# Patient Record
Sex: FEMALE | Race: WHITE | NOT HISPANIC OR LATINO | Employment: FULL TIME | URBAN - METROPOLITAN AREA
[De-identification: names, ages, dates, MRNs, and addresses within clinical notes are randomized per-mention and may not be internally consistent; named-entity substitution may affect disease eponyms.]

---

## 2024-04-03 ENCOUNTER — HOSPITAL ENCOUNTER (EMERGENCY)
Facility: HOSPITAL | Age: 29
Discharge: HOME | End: 2024-04-04
Attending: FAMILY MEDICINE

## 2024-04-03 DIAGNOSIS — M25.562 ACUTE PAIN OF LEFT KNEE: Primary | ICD-10-CM

## 2024-04-03 PROCEDURE — 63600175 PHARM REV CODE 636 W HCPCS: Performed by: PHYSICIAN ASSISTANT

## 2024-04-03 PROCEDURE — 99284 EMERGENCY DEPT VISIT MOD MDM: CPT | Mod: 25

## 2024-04-03 PROCEDURE — 96372 THER/PROPH/DIAG INJ SC/IM: CPT | Performed by: PHYSICIAN ASSISTANT

## 2024-04-03 RX ORDER — DEXAMETHASONE SODIUM PHOSPHATE 4 MG/ML
4 INJECTION, SOLUTION INTRA-ARTICULAR; INTRALESIONAL; INTRAMUSCULAR; INTRAVENOUS; SOFT TISSUE
Status: COMPLETED | OUTPATIENT
Start: 2024-04-03 | End: 2024-04-03

## 2024-04-03 RX ADMIN — DEXAMETHASONE SODIUM PHOSPHATE 4 MG: 4 INJECTION INTRA-ARTICULAR; INTRALESIONAL; INTRAMUSCULAR; INTRAVENOUS; SOFT TISSUE at 11:04

## 2024-04-03 NOTE — Clinical Note
"Aurora"Mary Dave was seen and treated in our emergency department on 4/3/2024.  She may return to work on 04/07/2024.       If you have any questions or concerns, please don't hesitate to call.      ARNOLD Wade RN    "

## 2024-04-03 NOTE — LETTER
"     [facility]  [address] [facility] [address]  Phone: [phone]  Fax: [phone]   April 4, 2024    Patient: Aurora Dave (Rachel)   YOB: 1995   Date of Visit: 4/3/2024   Patient ID [id number]       To Whom It May Concern:    Aurora Dave (Rachel) was seen and treated in our emergency department on 4/3/2024. She {Return to school/sport/work:69388}.      Sincerely,          ,       "

## 2024-04-04 VITALS
TEMPERATURE: 98 F | DIASTOLIC BLOOD PRESSURE: 102 MMHG | WEIGHT: 180 LBS | HEART RATE: 89 BPM | BODY MASS INDEX: 28.93 KG/M2 | SYSTOLIC BLOOD PRESSURE: 158 MMHG | RESPIRATION RATE: 18 BRPM | OXYGEN SATURATION: 99 % | HEIGHT: 66 IN

## 2024-04-04 RX ORDER — BACLOFEN 10 MG/1
10 TABLET ORAL 3 TIMES DAILY PRN
Qty: 15 TABLET | Refills: 0 | Status: SHIPPED | OUTPATIENT
Start: 2024-04-04 | End: 2024-04-09

## 2024-04-04 RX ORDER — INDOMETHACIN 50 MG/1
50 CAPSULE ORAL
Qty: 15 CAPSULE | Refills: 0 | Status: SHIPPED | OUTPATIENT
Start: 2024-04-04 | End: 2024-04-09

## 2024-04-04 NOTE — ED PROVIDER NOTES
"Encounter Date: 4/3/2024       History   No chief complaint on file.    Aurora Dave is a 28-year-old female who presents to the ED for evaluation of left knee pain. Patient reports straightening her knee last Wednesday when she felt a "pop" and developed pain immediately after. She has had pain with movement and bearing weight since that time. Was seen at outside ED on day of incident with normal x-ray. Was prescribed toradol with no improvement. States she has been elevating leg, wearing knee brace, and icing it also. Reports fracturing her knee a few years ago in [place]. She denies numbness, tingling, joint swelling.    The history is provided by the patient and a friend.     Review of patient's allergies indicates:  No Known Allergies  No past medical history on file.  No past surgical history on file.  No family history on file.     Review of Systems   Constitutional:  Negative for fever.   HENT:  Negative for sore throat.    Respiratory:  Negative for shortness of breath.    Cardiovascular:  Negative for chest pain.   Gastrointestinal:  Negative for nausea.   Genitourinary:  Negative for dysuria.   Musculoskeletal:  Positive for arthralgias. Negative for back pain.   Skin:  Negative for rash.   Neurological:  Negative for weakness.   Hematological:  Does not bruise/bleed easily.       Physical Exam     Initial Vitals [04/03/24 2235]   BP Pulse Resp Temp SpO2   (!) 166/112 85 20 98.2 °F (36.8 °C) 98 %      MAP       --         Physical Exam    Nursing note and vitals reviewed.  Constitutional: She appears well-developed and well-nourished. No distress.   HENT:   Head: Normocephalic and atraumatic.   Mouth/Throat: No oropharyngeal exudate.   Eyes: EOM are normal. No scleral icterus.   Neck: Neck supple.   Normal range of motion.  Cardiovascular:  Normal rate and regular rhythm.           No murmur heard.  Pulmonary/Chest: Breath sounds normal. No respiratory distress. She has no wheezes.   Abdominal: Abdomen is " soft. Bowel sounds are normal. She exhibits no distension. There is no abdominal tenderness.   Musculoskeletal:      Cervical back: Normal range of motion and neck supple.      Right knee: Normal.      Left knee: No crepitus. Decreased range of motion. Tenderness present. Normal pulse.      Comments: No laxity, edema, or obvious deformity. Dec ROM 2/2 pain.      Neurological: She is alert and oriented to person, place, and time. No cranial nerve deficit.   Skin: Skin is warm and dry. Capillary refill takes less than 2 seconds. No erythema.   Psychiatric: She has a normal mood and affect. Her behavior is normal. Judgment and thought content normal.         ED Course   Procedures  Labs Reviewed - No data to display       Imaging Results              X-Ray Knee 3 View Left (Preliminary result)  Result time 04/04/24 00:12:18      Wet Read by Qiana Cazares PA-C (04/04/24 00:12:18, [facility], Emergency Medicine)    No acute osseous abnormality                                     Medications   dexAMETHasone injection 4 mg (4 mg Intramuscular Given 4/3/24 3502)     Medical Decision Making  Differential: knee fracture, dislocation, sprain, among others    ED management: XR without acute fracture or dislocation on my independent interpretation. Pt stable for discharge with baclofen and indomethacin prn. Ortho referral placed for further evaluation. Instructed to follow up with PCP also in 3 days. ED return precautions given. She verbalized understanding. All questions answered.     Amount and/or Complexity of Data Reviewed  Labs: ordered.  Radiology: ordered and independent interpretation performed.    Risk  Prescription drug management.                                      Clinical Impression:  Final diagnoses:  [M25.562] Acute pain of left knee (Primary)          ED Disposition Condition    Discharge Stable          ED Prescriptions       Medication Sig Dispense Start Date End Date Auth.  Provider    baclofen (LIORESAL) 10 MG tablet Take 1 tablet (10 mg total) by mouth 3 (three) times daily as needed (pain, spasms). 15 tablet 4/4/2024 4/9/2024 Qiana Cazares PA-C    indomethacin (INDOCIN) 50 MG capsule Take 1 capsule (50 mg total) by mouth 3 (three) times daily with meals. for 5 days 15 capsule 4/4/2024 4/9/2024 Qiana Cazares PA-C          Follow-up Information       Follow up With Specialties Details Why Contact Info    [facility] Emergency Medicine  If symptoms worsen [address] [place] [address] [phone]    [facility] Orthoped[facility] Schedule an appointment as soon as possible for a visit   [address] [place] [id number] [phone]             Qiana Cazares PA-C  04/04/24 0015

## 2025-02-19 ENCOUNTER — HOSPITAL ENCOUNTER (EMERGENCY)
Facility: HOSPITAL | Age: 30
Discharge: HOME | End: 2025-02-19
Attending: INTERNAL MEDICINE
Payer: COMMERCIAL

## 2025-02-19 VITALS
SYSTOLIC BLOOD PRESSURE: 155 MMHG | TEMPERATURE: 98 F | BODY MASS INDEX: 28.09 KG/M2 | HEART RATE: 92 BPM | WEIGHT: 174.81 LBS | OXYGEN SATURATION: 99 % | DIASTOLIC BLOOD PRESSURE: 89 MMHG | HEIGHT: 66 IN | RESPIRATION RATE: 18 BRPM

## 2025-02-19 DIAGNOSIS — S39.012A LUMBAR STRAIN, INITIAL ENCOUNTER: Primary | ICD-10-CM

## 2025-02-19 DIAGNOSIS — Z86.39 HISTORY OF DIABETES MELLITUS: ICD-10-CM

## 2025-02-19 LAB
B-HCG UR QL: NEGATIVE
CTP QC/QA: YES

## 2025-02-19 PROCEDURE — 25000003 PHARM REV CODE 250

## 2025-02-19 PROCEDURE — 81025 URINE PREGNANCY TEST: CPT

## 2025-02-19 PROCEDURE — 99284 EMERGENCY DEPT VISIT MOD MDM: CPT | Mod: 25

## 2025-02-19 RX ORDER — DICLOFENAC SODIUM 75 MG/1
75 TABLET, DELAYED RELEASE ORAL 2 TIMES DAILY
Qty: 14 TABLET | Refills: 0 | Status: SHIPPED | OUTPATIENT
Start: 2025-02-19 | End: 2025-02-26

## 2025-02-19 RX ORDER — DICLOFENAC SODIUM 10 MG/G
2 GEL TOPICAL 4 TIMES DAILY
Qty: 2 G | Refills: 0 | Status: SHIPPED | OUTPATIENT
Start: 2025-02-19 | End: 2025-02-26

## 2025-02-19 RX ORDER — CYCLOBENZAPRINE HCL 10 MG
10 TABLET ORAL
Status: COMPLETED | OUTPATIENT
Start: 2025-02-19 | End: 2025-02-19

## 2025-02-19 RX ORDER — HYDROCODONE BITARTRATE AND ACETAMINOPHEN 5; 325 MG/1; MG/1
1 TABLET ORAL
Status: COMPLETED | OUTPATIENT
Start: 2025-02-19 | End: 2025-02-19

## 2025-02-19 RX ORDER — CYCLOBENZAPRINE HCL 10 MG
10 TABLET ORAL 3 TIMES DAILY PRN
Qty: 15 TABLET | Refills: 0 | Status: SHIPPED | OUTPATIENT
Start: 2025-02-19 | End: 2025-02-24

## 2025-02-19 RX ADMIN — HYDROCODONE BITARTRATE AND ACETAMINOPHEN 1 TABLET: 5; 325 TABLET ORAL at 05:02

## 2025-02-19 RX ADMIN — CYCLOBENZAPRINE 10 MG: 10 TABLET, FILM COATED ORAL at 05:02

## 2025-02-19 NOTE — ED PROVIDER NOTES
Encounter Date: 2/19/2025       History     Chief Complaint   Patient presents with    Hip Pain     Reports left hip pain that radiates to the knee. States possible injury to it after the weekend and carrying an ice chest.     29-year-old female with past medical history of diabetes presents to the emergency department complaining of left lower back pain that radiates down her leg for the past 3 days.  She has tried ibuprofen and Tylenol with no relief.  Her pain is constant and unchanged.  She denies trauma.  She did lift a heavy ice chest the day before her pain began.  She denies incontinence of bowel and bladder, saddle anesthesia, dysuria, urinary frequency, chills, fever, numbness, tingling, weakness.     The history is provided by the patient.     Review of patient's allergies indicates:  No Known Allergies  History reviewed. No pertinent past medical history.  History reviewed. No pertinent surgical history.  No family history on file.  Social History[1]  Review of Systems   Constitutional: Negative.    HENT: Negative.     Eyes: Negative.    Respiratory: Negative.     Cardiovascular: Negative.    Gastrointestinal: Negative.    Genitourinary: Negative.    Musculoskeletal:  Positive for back pain, gait problem and myalgias. Negative for arthralgias and joint swelling.   Skin: Negative.    All other systems reviewed and are negative.      Physical Exam     Initial Vitals [02/19/25 1642]   BP Pulse Resp Temp SpO2   (!) 160/98 97 18 98.3 °F (36.8 °C) 99 %      MAP       --         Physical Exam    Nursing note and vitals reviewed.  Constitutional: Vital signs are normal. She appears well-developed and well-nourished. She is not diaphoretic. She is cooperative.  Non-toxic appearance. She does not have a sickly appearance. She does not appear ill. No distress.   HENT:   Head: Normocephalic and atraumatic.   Right Ear: Hearing normal.   Left Ear: Hearing normal.   Nose: Nose normal. Mouth/Throat: Uvula is midline  and oropharynx is clear and moist.   Eyes: Conjunctivae and EOM are normal. Pupils are equal, round, and reactive to light.   Neck: Trachea normal and phonation normal. Neck supple.   Normal range of motion.  Cardiovascular:  Normal rate, regular rhythm, normal heart sounds, intact distal pulses and normal pulses.           Pulmonary/Chest: Effort normal and breath sounds normal. No accessory muscle usage. No tachypnea and no bradypnea. No respiratory distress. She has no decreased breath sounds. She has no wheezes. She has no rhonchi. She has no rales.   Normal effort, symmetrical chest rise and fall, no accessory muscle use. Bilateral clear breath sounds in all fields anterior and posterior.      Abdominal: Abdomen is soft. Bowel sounds are normal. She exhibits no distension. There is no abdominal tenderness.   Abdomen is soft, nontender, no peritoneal signs. Tolerating PO fluids.      Musculoskeletal:      Cervical back: Normal, normal range of motion and neck supple.      Thoracic back: Normal.      Lumbar back: Spasms and tenderness present. Decreased range of motion. Positive left straight leg raise test.        Back:       Comments: Left Lumbar region TTP, no crepitus, no bony tenderness, right straight leg negative, left straight positive 35%, left strength 4/5 right strength 5/5, coordinated gait, sensation intact, cap refill < 2 seconds, +2 pedal pulse, bilateral lower extremities are pink, warm, skin intact.       Neurological: She is alert and oriented to person, place, and time. She has normal strength. GCS score is 15. GCS eye subscore is 4. GCS verbal subscore is 5. GCS motor subscore is 6.   Skin: Skin is warm, dry and intact. Capillary refill takes less than 2 seconds. No rash noted. No erythema. No pallor.   Psychiatric: She has a normal mood and affect. Thought content normal.         ED Course   Procedures  Labs Reviewed   POCT URINE PREGNANCY       Result Value    POC Preg Test, Ur Negative        Acceptable Yes            Imaging Results              X-Ray Lumbar Spine 2 Or 3 Views (Final result)  Result time 02/19/25 17:45:33      Final result by Martha Mcclure MD (02/19/25 17:45:33)                   Impression:      No acute bony abnormality.      Electronically signed by: Martha Mcclure  Date:    02/19/2025  Time:    17:45               Narrative:    EXAMINATION:  XR LUMBAR SPINE 2 OR 3 VIEWS    COMPARISON:  None.    FINDINGS:  There are 5 non-rib-bearing lumbar type vertebral bodies.  Alignment is normal.  The vertebral body heights and disc spaces are maintained.  The soft tissues are unremarkable.                                       Medications   cyclobenzaprine tablet 10 mg (10 mg Oral Given 2/19/25 1730)   HYDROcodone-acetaminophen 5-325 mg per tablet 1 tablet (1 tablet Oral Given 2/19/25 1730)     Medical Decision Making  Lumbar fracture, spinal stenosis, epidural abscess, spine osteomyelitis, MS, cauda equina, among others    Nontraumtic back pain    [facility] radiology  Flexeril and hydrocodone    Patient requesting referral to Internal Medicine for diabetes management    The patient is a 29 y.o. female who presents to the [facility] Emergency Department with a chief complaint of left lower back pain that radiates down leg.  EPIC records were reviewed. Lab work was ordered and   reviewed.. A x-ray was also ordered and reviewed. Imaging shows no acute abnormalitie. The patient was provided with Norco and Flexeril resulting in near complete resolution of symptoms. The patient was discharged home with a diagnosis of lumbar strain. The patient was advised to rest. It was recommended for the patient to follow up with primary care.  The patient was provided prescriptions for Flexeril and Mobic.  The patient's vital signs and condition remained stable while undergoing evaluation in the Emergency Department. The patient agreed with the plan for care.   The patient is nontoxic appearing,  in no acute distress, with stable vital signs and resting comfortably. There is no objective evidence requiring urgent intervention or hospitalization. I provided counseling to the patient with regard to the medical condition, the treatment plan, specific conditions for return, and the importance of follow up. Detailed written and verbal instructions were provided to the patient and he/she expressed a verbal understanding of the discharge instructions and overall management plan. Reiterated the importance of medication administration and safety, advised the patient to follow up with primary care provider in 3-5 days or sooner if needed. All questions and concerns were addressed before leaving the Emergency Department. The patient is stable for discharge.       Amount and/or Complexity of Data Reviewed  Labs: ordered.  Radiology: ordered. Decision-making details documented in ED Course.    Risk  Prescription drug management.               ED Course as of 02/19/25 1802   Wed Feb 19, 2025   1756 X-Ray Lumbar Spine 2 Or 3 Views  No acute abnormalities [RQ]      ED Course User Index  [RQ] Aurora Zuluaga FNP                           Clinical Impression:  Final diagnoses:  [S39.012A] Lumbar strain, initial encounter (Primary)  [Z86.39] History of diabetes mellitus          ED Disposition Condition    Discharge Stable          ED Prescriptions       Medication Sig Dispense Start Date End Date Auth. Provider    cyclobenzaprine (FLEXERIL) 10 MG tablet Take 1 tablet (10 mg total) by mouth 3 (three) times daily as needed for Muscle spasms. 15 tablet 2/19/2025 2/24/2025 Aurora Zuluaga FNP    diclofenac (VOLTAREN) 75 MG EC tablet Take 1 tablet (75 mg total) by mouth 2 (two) times daily. for 7 days 14 tablet 2/19/2025 2/26/2025 Aurora Zuluaga FNP    diclofenac sodium (VOLTAREN ARTHRITIS PAIN) 1 % Gel Apply 2 g topically 4 (four) times daily. for 7 days 2 g 2/19/2025 2/26/2025 Aurora Zuluaga FNP          Follow-up  Information       Follow up With Specialties Details Why Contact Info    PCP  In 1 week  Follow up with PCP in 3-5 days.    [facility] Emergency Medicine  If symptoms worsen [address] [facility] [address] [phone]               [1]   Social History  Tobacco Use    Smoking status: Every Day     Types: Vaping with nicotine    Smokeless tobacco: Never        Aurora Zuluaga FNP  02/19/25 [id number]

## 2025-02-19 NOTE — Clinical Note
"Aurora"Mary Dave was seen and treated in our emergency department on 2/19/2025.  She may return to work on 02/22/2025.       If you have any questions or concerns, please don't hesitate to call.      Puja QUAN    "